# Patient Record
Sex: FEMALE | Race: BLACK OR AFRICAN AMERICAN | Employment: PART TIME | URBAN - METROPOLITAN AREA
[De-identification: names, ages, dates, MRNs, and addresses within clinical notes are randomized per-mention and may not be internally consistent; named-entity substitution may affect disease eponyms.]

---

## 2021-03-17 VITALS
HEART RATE: 76 BPM | SYSTOLIC BLOOD PRESSURE: 170 MMHG | BODY MASS INDEX: 41.62 KG/M2 | DIASTOLIC BLOOD PRESSURE: 74 MMHG | WEIGHT: 212 LBS | HEIGHT: 60 IN

## 2021-03-24 ENCOUNTER — OFFICE VISIT (OUTPATIENT)
Dept: CARDIOLOGY CLINIC | Age: 76
End: 2021-03-24
Payer: MEDICARE

## 2021-03-24 VITALS
DIASTOLIC BLOOD PRESSURE: 78 MMHG | WEIGHT: 192 LBS | HEIGHT: 60 IN | SYSTOLIC BLOOD PRESSURE: 138 MMHG | BODY MASS INDEX: 37.69 KG/M2 | HEART RATE: 86 BPM

## 2021-03-24 DIAGNOSIS — I70.90 ATHEROSCLEROSIS: ICD-10-CM

## 2021-03-24 DIAGNOSIS — E66.01 CLASS 2 SEVERE OBESITY DUE TO EXCESS CALORIES WITH SERIOUS COMORBIDITY IN ADULT, UNSPECIFIED BMI (HCC): ICD-10-CM

## 2021-03-24 DIAGNOSIS — Z00.00 NORMAL CARDIAC EXAM: Primary | ICD-10-CM

## 2021-03-24 DIAGNOSIS — E78.5 BORDERLINE HYPERLIPIDEMIA: ICD-10-CM

## 2021-03-24 PROCEDURE — G8427 DOCREV CUR MEDS BY ELIG CLIN: HCPCS | Performed by: INTERNAL MEDICINE

## 2021-03-24 PROCEDURE — 93000 ELECTROCARDIOGRAM COMPLETE: CPT | Performed by: INTERNAL MEDICINE

## 2021-03-24 PROCEDURE — 99203 OFFICE O/P NEW LOW 30 MIN: CPT | Performed by: INTERNAL MEDICINE

## 2021-03-24 PROCEDURE — G8417 CALC BMI ABV UP PARAM F/U: HCPCS | Performed by: INTERNAL MEDICINE

## 2021-03-24 PROCEDURE — G8484 FLU IMMUNIZE NO ADMIN: HCPCS | Performed by: INTERNAL MEDICINE

## 2021-03-24 PROCEDURE — 1090F PRES/ABSN URINE INCON ASSESS: CPT | Performed by: INTERNAL MEDICINE

## 2021-03-24 RX ORDER — CARVEDILOL 3.12 MG/1
TABLET ORAL
COMMUNITY
Start: 2021-03-09 | End: 2021-09-08

## 2021-03-24 RX ORDER — PRAVASTATIN SODIUM 20 MG
TABLET ORAL
COMMUNITY
Start: 2021-02-08 | End: 2021-09-08

## 2021-03-24 NOTE — ASSESSMENT & PLAN NOTE
Last LDL was 129 not high enough to recommend statin therapy and she reports that this was done on the medication. She will have a follow-up labs with PCP.

## 2021-03-24 NOTE — PATIENT INSTRUCTIONS
Please be informed that if you contact our office outside of normal business hours the physician on call cannot help with any scheduling or rescheduling issues, procedure instruction questions or any type of medication issue. We advise you for any urgent/emergency that you go to the nearest emergency room! PLEASE CALL OUR OFFICE DURING NORMAL BUSINESS HOURS    Monday - Friday   8 am to 5 pm    [place] [address]: [phone]    [place]:  [phone]  **It is YOUR responsibilty to bring medication bottles and/or updated medication list to [address] [place] [address]. This will allow us to better serve you and all your healthcare needs**  Does not want to take any prescription medications. Counseled to continue with regular exercise. Primary prevention is the goal by aggressive risk modification, healthy and therapeutic life style changes for cardiovascular risk reduction. Various goals are discussed and questions answered. Multiple questions answered. Patient verbalizes understanding and asks relevant questions. Follow up with PCP and see me as needed. She request annual follow up.

## 2021-03-24 NOTE — PROGRESS NOTES
medicine. He wants to do everything to prevent problems so she does not have to go on medications. REVIEW OF SYSTEMS:   Constitutional:  denies generalized weakness  Eyes:  Denies change in visual acuity  HENT:  Denies nasal congestion or sore throat  Respiratory:  Denies cough or shortness of breath  Cardiovascular: as in HPI  GI:  Denies abdominal pain, complains of nausea and vomiting  :  Denies dysuria  Musculoskeletal:  Denies back pain or joint pain  Integument:  Denies rash  Neurologic:  Denies headache, focal weakness or sensory changes  \"Remaining review of systems reviewed and negative. Past medical history:  has a past medical history of History of Holter monitoring, History of stress test, Hx of cardiovascular stress test, and Hx of echocardiogram.  Past surgical history:  has a past surgical history that includes Tonsillectomy and Colonoscopy. Social History:   Social History     Tobacco Use    Smoking status: Never Smoker    Smokeless tobacco: Never Used   Substance Use Topics    Alcohol use: No     Family history: family history includes Cancer in her father and mother. No Known Allergies  Prior to Admission medications    Medication Sig Start Date End Date Taking? Authorizing Provider   carvedilol (COREG) 3.125 MG tablet TAKE 1 TABLET BY MOUTH TWICE DAILY WITH FOOD 3/9/21   Historical Provider, MD   pravastatin (PRAVACHOL) 20 MG tablet  2/8/21   Historical Provider, MD     Physical Examination:    Vitals:    03/24/21 1432   BP: 138/78   Pulse: 86   Weight: 192 lb (87.1 kg)   Height: 5' (1.524 m)      Body mass index is 37.5 kg/m². Wt Readings from Last 3 Encounters:   03/24/21 192 lb (87.1 kg)   07/23/18 212 lb (96.2 kg)   08/23/13 215 lb (97.5 kg)     Constitutional: Moderately overweight -American female in no apparent distress  Eyes: She is wearing glasses. No conjunctival pallor noted  ENT: She is wearing a mask. NECK: No JVP or thyromegaly  Cardiovascular:   Auscultation:

## 2021-03-24 NOTE — ASSESSMENT & PLAN NOTE
Patient is asymptomatic. Continue regular exercises and non-pharmacological approach as she is hesitant to take any prescription medications for primary prevention.

## 2021-03-25 ENCOUNTER — TELEPHONE (OUTPATIENT)
Dept: CARDIOLOGY CLINIC | Age: 76
End: 2021-03-25

## 2021-03-25 NOTE — TELEPHONE ENCOUNTER
Pt was a new pt to Dr Alfonso aBss yesterday, she has questions, about he after visit jorge. Please advise.

## 2021-03-25 NOTE — TELEPHONE ENCOUNTER
Patient saw doctor yesterday but has question about the leakage in heart that he mentioned.   Please call pt [phone]

## 2021-03-26 NOTE — TELEPHONE ENCOUNTER
Spoke to the patient and she is questioning the interpretation of her Echo that Dr. Rosendo Knight gave her. She does not see it on her report and was hoping he could explain to her again what he mentioned to her. She was thinking it was along the lines of a leaky valve. Expressed to patient that we would talk with Dr. Rosendo Knight and that we would call her back or have him speak to her again over the phone on Monday. Spoke to MARIZOL Garrido so she can get with Dr. Rosendo Knight on Monday about this patient's request.

## 2021-08-18 ENCOUNTER — TELEPHONE (OUTPATIENT)
Dept: CARDIOLOGY CLINIC | Age: 76
End: 2021-08-18

## 2021-08-18 DIAGNOSIS — E78.5 BORDERLINE HYPERLIPIDEMIA: Primary | ICD-10-CM

## 2021-09-07 ENCOUNTER — TELEPHONE (OUTPATIENT)
Dept: CARDIOLOGY CLINIC | Age: 76
End: 2021-09-07

## 2021-09-07 NOTE — TELEPHONE ENCOUNTER
Chart reviewed. Seen was March 2001. If she is having new symptoms of increasing leg swelling she needs to be seen for further recommendations.

## 2021-09-07 NOTE — TELEPHONE ENCOUNTER
Patient called at her last visit Dr Martir Lopez  Had suggested that she take Coreg  At that time she was reluctant   To take any medications, she indicated   That she has some swelling in her left leg  As well

## 2021-09-08 ENCOUNTER — TELEPHONE (OUTPATIENT)
Dept: CARDIOLOGY CLINIC | Age: 76
End: 2021-09-08

## 2021-09-08 ENCOUNTER — OFFICE VISIT (OUTPATIENT)
Dept: CARDIOLOGY CLINIC | Age: 76
End: 2021-09-08
Payer: MEDICARE

## 2021-09-08 VITALS
WEIGHT: 189.6 LBS | HEART RATE: 70 BPM | DIASTOLIC BLOOD PRESSURE: 80 MMHG | SYSTOLIC BLOOD PRESSURE: 130 MMHG | BODY MASS INDEX: 30.47 KG/M2 | HEIGHT: 66 IN

## 2021-09-08 DIAGNOSIS — E78.5 BORDERLINE HYPERLIPIDEMIA: Primary | ICD-10-CM

## 2021-09-08 DIAGNOSIS — I70.90 ATHEROSCLEROSIS: ICD-10-CM

## 2021-09-08 PROCEDURE — G8400 PT W/DXA NO RESULTS DOC: HCPCS | Performed by: INTERNAL MEDICINE

## 2021-09-08 PROCEDURE — G8427 DOCREV CUR MEDS BY ELIG CLIN: HCPCS | Performed by: INTERNAL MEDICINE

## 2021-09-08 PROCEDURE — 1123F ACP DISCUSS/DSCN MKR DOCD: CPT | Performed by: INTERNAL MEDICINE

## 2021-09-08 PROCEDURE — 99214 OFFICE O/P EST MOD 30 MIN: CPT | Performed by: INTERNAL MEDICINE

## 2021-09-08 PROCEDURE — 1036F TOBACCO NON-USER: CPT | Performed by: INTERNAL MEDICINE

## 2021-09-08 PROCEDURE — 4040F PNEUMOC VAC/ADMIN/RCVD: CPT | Performed by: INTERNAL MEDICINE

## 2021-09-08 PROCEDURE — 3017F COLORECTAL CA SCREEN DOC REV: CPT | Performed by: INTERNAL MEDICINE

## 2021-09-08 PROCEDURE — 1090F PRES/ABSN URINE INCON ASSESS: CPT | Performed by: INTERNAL MEDICINE

## 2021-09-08 PROCEDURE — G8417 CALC BMI ABV UP PARAM F/U: HCPCS | Performed by: INTERNAL MEDICINE

## 2021-09-08 NOTE — LETTER
Cristian Dang  1945  [id number]    Have you had any Chest Pain that is not new? - No          Have you had any Shortness of Breath - No      Have you had any dizziness - No       Have you had any palpitations that are not new? - No    Is the patient on any of the following medications -   If Yes DO EKG - Needs done every 6 months    Do you have any edema - swelling in legs    If Yes - CHECK TO SEE IF THE EDEMA IS PITTING  How long have they been having edema - Months  If Yes - Have they worn compression stockings Yes  If they have worn compression stockings 2 Months    Vein \"LEG PROBLEM Questionnaire\"  1. Do you have prominent leg veins? Yes   2. Do you have any skin discoloration? Yes  3. Do you have any healed or active sores? No  4. Do you have swelling of the legs? Yes  5. Do you have a family history of varicose veins? No  6. 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 Yes  7. Have you been fighting overweight problems? No  8. Do you have restless legs? No  9. Do you have any night time cramps? No  10. Do you have any of the following in your legs:        Aching         If we do not have these labs you are retrieve these labs for these providers! Do you have a surgery or procedure scheduled in the near future -   If Yes- DO EKG  If Yes - Who is the surgery with?    Phone number of physician   Fax number of physician   Type of surgery GIVE THIS INFORMATION TO YRN SAENZ     Ask patient if they want to [facility] if they are not already signed up     Check to see if we have an E-MAIL on file for the patient     Check medication list thoroughly!!! AND RECONCILE OUTSIDE MEDICATIONS  If dose has changed change the entire order not just the [place] At check out add to every patient's \"wrap up\" the following dot phrase AFTERHOURSEDUCATION and ensure we explain this to our patients

## 2021-09-08 NOTE — ASSESSMENT & PLAN NOTE
Patient has no symptoms to suggest significant obstructive coronary artery disease. Counseled for aggressive healthy lifestyle changes for primary prevention.

## 2021-09-08 NOTE — ASSESSMENT & PLAN NOTE
She has not started taking pravastatin and does not like to take any medications. Her lipids are only borderline high on her recent testing on August 23, 2021. LDL was reportedly 129. Counseled for diet and exercise and weight management to reduce it for primary prevention.

## 2021-09-08 NOTE — PATIENT INSTRUCTIONS
**It is YOUR responsibilty to bring medication bottles and/or updated medication list to [address] [place] [address]. This will allow us to better serve you and all your healthcare needs**      Please be informed that if you contact our office outside of normal business hours the physician on call cannot help with any scheduling or rescheduling issues, procedure instruction questions or any type of medication issue. We advise you for any urgent/emergency that you go to the nearest emergency room! PLEASE CALL OUR OFFICE DURING NORMAL BUSINESS HOURS    Monday - Friday   8 am to 5 pm    [place] [address]: [phone]    [place]:  [phone]      Diet exercise and follow up with PCP. Primary prevention is the goal by aggressive risk modification, healthy and therapeutic life style changes for cardiovascular risk reduction. Various goals are discussed and questions answered.

## 2021-09-08 NOTE — PROGRESS NOTES
Renny Gomez (:  1945) is a 76 y.o. female,     Chief Complaint   Patient presents with    Hyperlipidemia     Patient here today to discuss medications and leg swelling.  Edema     Patient states she has been wear compression stocking off and on. Patient states she has prominent leg veins, skin discoloration, swellign in both legs , and she stand for long periods of time.  Other     Patient does not smoke or drink alochol. Patient states she does walk about 30min to 1 hour a day. Patient is here to discuss health concerns particularly she wants to talk about whether she has fungus infection in her body and if we can do test to rule it out and she wanted to just be here to talk to me because she has no one to talk to. In fact she had called yesterday asking to see if she can start carvedilol therapy for increasing leg swelling reason for us to arrange this office visit. Last time she was seen was in March of this year and she did not have any leg swelling at the time. Today she tells me actually she does not have any leg swelling now she is wanted to be here to talk to us about issues mentioned above. She thinks she needs to be checked for fungal infection in her body and wanted to know if her heart is okay. She has been listed with carvedilol and pravastatin as medications she should be taking prescribed by some other physicians however she tells me she is not taking any prescription medications and she rather not take 1. She does not smoke. She stays active and denies any chest pain or shortness of breath or palpitations or syncope or near syncope. She is fully vaccinated against COVID-19. No Known Allergies  Prior to Admission medications    Not on File     Past Medical History:   Diagnosis Date    History of Holter monitoring 2018    24 hour monitor: SR noted, Min HR 52 bpm, Ave HR 79 bpm, Max  bpm, no significant pauses noted.  no diary entries    History of stress test 07/26/2018    Stress Nuclear: EF 70%, ESV 8 Mild to Mod lateral & inferior wall perfusion defect(s), Artifacts decrease sensitivity of the finding. non diagnostic ST segment changes no arrythmia    Hx of cardiovascular stress test 07/30/2015    treadmill    Hx of echocardiogram 07/26/2018    EF 57%, LA normal, Mild-moderate MR, Impaired relaxation diastolic dysfunction, LA diam 3.7 cm, Est RVSP 39 mmHg, Normal LVEDP      Vitals:    09/08/21 1514   BP: 130/80   Pulse: 70   Weight: 189 lb 9.6 oz (86 kg)   Height: 5' 6\" (1.676 m)      Body mass index is 30.6 kg/m². Wt Readings from Last 3 Encounters:   09/08/21 189 lb 9.6 oz (86 kg)   03/24/21 192 lb (87.1 kg)   07/23/18 212 lb (96.2 kg)     Constitutional:  Patient is mildly overweight female in no apparent distress. HEENT: She is wearing facemask and glasses otherwise unremarkable. Cardiovascular: Auscultation: Normal S1 and S2. No murmurs or gallops noted. Carotids negative for bruits. Abdominal aorta is nonpalpable. No epigastric bruit noted. Peripheral pulses: Pedal pulses are 1-2+ equal in both feet. Respiratory:  Respiratory effort is normal. Breath sounds are clear to auscultation. Extremities:  No edema, clubbing,  Cyanosis, petechiae. Abdomen:  No masses or tenderness. No organomegaly noted. Neurologic:  Oriented to time, place, and person and non-anxious. No focal neurological deficit noted. Psychiatric: Normal mood and effect.      Pertinent records reviewed and discussed with patient and results are as follow:    Lab Results   Component Value Date    WBC 6.9 07/23/2015    HGB 12.6 07/23/2015    HCT 37.1 07/23/2015     07/23/2015     Lab Results   Component Value Date    CHOL 191 08/23/2021    TRIG 107 08/23/2021    HDL 41 08/23/2021    LDLCALC 129 (H) 08/23/2021    LDLDIRECT 120 (H) 05/29/2015     Lab Results   Component Value Date    BUN 9 07/23/2015    CREATININE 0.8 07/23/2015     07/23/2015    K 3.8 07/23/2015     Lab Results   Component Value Date    INR 1.10 07/23/2015     ASSESSMENT/PLAN:    1. Borderline hyperlipidemia  Assessment & Plan:  She has not started taking pravastatin and does not like to take any medications. Her lipids are only borderline high on her recent testing on August 23, 2021. LDL was reportedly 129. Counseled for diet and exercise and weight management to reduce it for primary prevention. 2. Atherosclerosis  Assessment & Plan:  Patient has no symptoms to suggest significant obstructive coronary artery disease. Counseled for aggressive healthy lifestyle changes for primary prevention. Diet exercise and follow up with PCP. Primary prevention is the goal by aggressive risk modification, healthy and therapeutic life style changes for cardiovascular risk reduction. Various goals are discussed and questions answered. An electronic signature was used to authenticate this note.     --Beata Lara MD

## 2021-09-09 ENCOUNTER — TELEPHONE (OUTPATIENT)
Dept: CARDIOLOGY CLINIC | Age: 76
End: 2021-09-09

## 2021-09-09 NOTE — TELEPHONE ENCOUNTER
Patient called stating that she told Alfonso Negrete that her legs burn/sting sometimes and that there is a stiffness in the the back of her neck that may be arthritis. Please call her back to let her know that Dr. Martha Garcia knows.

## 2021-09-10 NOTE — TELEPHONE ENCOUNTER
Patient feels that she is not being listened to, wants to look into fungal infection. Advised to see PCP and discuss these issues. She wondered why we didn't do ekg, advised it was done in March at her initial visit, she did not recall.  Patient thanked me for listening and will follow up with PCP

## 2022-03-23 ENCOUNTER — OFFICE VISIT (OUTPATIENT)
Dept: CARDIOLOGY CLINIC | Age: 77
End: 2022-03-23
Payer: COMMERCIAL

## 2022-03-23 VITALS
DIASTOLIC BLOOD PRESSURE: 76 MMHG | HEIGHT: 66 IN | WEIGHT: 188 LBS | BODY MASS INDEX: 30.22 KG/M2 | SYSTOLIC BLOOD PRESSURE: 122 MMHG | HEART RATE: 80 BPM

## 2022-03-23 DIAGNOSIS — E78.5 BORDERLINE HYPERLIPIDEMIA: Primary | ICD-10-CM

## 2022-03-23 DIAGNOSIS — M79.89 LEFT LEG SWELLING: ICD-10-CM

## 2022-03-23 PROCEDURE — G8417 CALC BMI ABV UP PARAM F/U: HCPCS | Performed by: INTERNAL MEDICINE

## 2022-03-23 PROCEDURE — 4040F PNEUMOC VAC/ADMIN/RCVD: CPT | Performed by: INTERNAL MEDICINE

## 2022-03-23 PROCEDURE — 1036F TOBACCO NON-USER: CPT | Performed by: INTERNAL MEDICINE

## 2022-03-23 PROCEDURE — G8484 FLU IMMUNIZE NO ADMIN: HCPCS | Performed by: INTERNAL MEDICINE

## 2022-03-23 PROCEDURE — G8400 PT W/DXA NO RESULTS DOC: HCPCS | Performed by: INTERNAL MEDICINE

## 2022-03-23 PROCEDURE — 1123F ACP DISCUSS/DSCN MKR DOCD: CPT | Performed by: INTERNAL MEDICINE

## 2022-03-23 PROCEDURE — 1090F PRES/ABSN URINE INCON ASSESS: CPT | Performed by: INTERNAL MEDICINE

## 2022-03-23 PROCEDURE — G8427 DOCREV CUR MEDS BY ELIG CLIN: HCPCS | Performed by: INTERNAL MEDICINE

## 2022-03-23 PROCEDURE — 99213 OFFICE O/P EST LOW 20 MIN: CPT | Performed by: INTERNAL MEDICINE

## 2022-03-23 NOTE — PROGRESS NOTES
Otf Dominguez (:  1945) is a 68 y.o. female,     Chief Complaint   Patient presents with    1 Year Follow Up     Pt denies any cardiac symptoms. Some swelling on the left leg and No surgeries or procedures scheduled. Patient is here for leg swelling. She reports her left leg swelling and it hurts when she is not walking. She walks 2 miles a day for exercise regularly without any pain. She does not take much medications. She does not smoke. She was seen last year and she was asked to follow-up with PCP and see me as needed. No Known Allergies  Prior to Admission medications    Not on File     Past Medical History:   Diagnosis Date    History of Holter monitoring 2018    24 hour monitor: SR noted, Min HR 52 bpm, Ave HR 79 bpm, Max  bpm, no significant pauses noted. no diary entries    History of stress test 2018    Stress Nuclear: EF 70%, ESV 8 Mild to Mod lateral & inferior wall perfusion defect(s), Artifacts decrease sensitivity of the finding. non diagnostic ST segment changes no arrythmia    Hx of cardiovascular stress test 2015    treadmill    Hx of echocardiogram 2018    EF 57%, LA normal, Mild-moderate MR, Impaired relaxation diastolic dysfunction, LA diam 3.7 cm, Est RVSP 39 mmHg, Normal LVEDP    Left leg swelling 3/23/2022    1+ pitting edema in leg but not in ankle      Vitals:    22 1056   BP: 122/76   Pulse: 80   Weight: 188 lb (85.3 kg)   Height: 5' 6\" (1.676 m)      Body mass index is 30.34 kg/m². Wt Readings from Last 3 Encounters:   22 188 lb (85.3 kg)   21 189 lb 9.6 oz (86 kg)   21 192 lb (87.1 kg)     Constitutional:  Patient is mildly overweight female in no apparent distress. HEENT: She is wearing facemask and glasses otherwise unremarkable. Cardiovascular: Auscultation: Normal S1 and S2. No murmurs or gallops noted. Carotids negative for bruits. Abdominal aorta is nonpalpable. No epigastric bruit noted. Peripheral pulses: Pedal pulses are 1-2+ equal in both feet. Respiratory:  Respiratory effort is normal. Breath sounds are clear to auscultation. Extremities:  1+ left leg edema noted. Ankle has no pitting. Abdomen:  No masses or tenderness. No organomegaly noted. Neurologic:  Oriented to time, place, and person and non-anxious. No focal neurological deficit noted. Psychiatric: Normal mood and effect. Pertinent records reviewed and discussed with patient and results are as follow:    Lab Results   Component Value Date    WBC 6.9 07/23/2015    HGB 12.6 07/23/2015    HCT 37.1 07/23/2015     07/23/2015     Lab Results   Component Value Date    CHOL 191 08/23/2021    TRIG 107 08/23/2021    HDL 41 08/23/2021    LDLCALC 129 (H) 08/23/2021    LDLDIRECT 120 (H) 05/29/2015     Lab Results   Component Value Date    BUN 9 07/23/2015    CREATININE 0.8 07/23/2015     07/23/2015    K 3.8 07/23/2015     Lab Results   Component Value Date    INR 1.10 07/23/2015     ASSESSMENT/PLAN:    1. Left leg swelling  Assessment & Plan:   Counseled to elevate feet when resting and when resting in bed at night with pillows underneath and use thigh high compression stockings in the morning and remove them at night. Patient verbalizes understanding. Questions answered. Counseled for salt restriction. Continue to exercise regularly  Orders:  -     VL DUP LOWER EXTREMITY VENOUS LEFT; Future  -     Comprehensive Metabolic Panel; Future      Counseled to elevate feet when resting and when resting in bed at night with pillows underneath and use thigh high compression stockings in the morning and remove them at night. Patient verbalizes understanding. Questions answered. Counseled for salt restriction. Continue to exercise regularly. Follow-up in 6 months, sooner if needed. An electronic signature was used to authenticate this note.     --Emeka Lawton MD

## 2022-03-23 NOTE — PATIENT INSTRUCTIONS
Counseled to elevate feet when resting and when resting in bed at night with pillows underneath and use thigh high compression stockings in the morning and remove them at night. Patient verbalizes understanding. Questions answered. Counseled for salt restriction. Continue to exercise regularly. Follow-up in 6 months, sooner if needed.

## 2022-03-23 NOTE — ASSESSMENT & PLAN NOTE
Counseled to elevate feet when resting and when resting in bed at night with pillows underneath and use thigh high compression stockings in the morning and remove them at night. Patient verbalizes understanding. Questions answered. Counseled for salt restriction.   Continue to exercise regularly

## 2022-03-24 ENCOUNTER — TELEPHONE (OUTPATIENT)
Dept: CARDIOLOGY CLINIC | Age: 77
End: 2022-03-24

## 2022-03-24 NOTE — TELEPHONE ENCOUNTER
Does not have any significant known cardiac issues. She should have PCP evaluate her for these symptoms.

## 2022-03-24 NOTE — TELEPHONE ENCOUNTER
Pt came into office today stating that she did not tell anyone yesterday that her neck is stiff and she wants to have her LT arm checked out because she sometimes has discomfort in it.

## 2022-03-31 ENCOUNTER — PROCEDURE VISIT (OUTPATIENT)
Dept: CARDIOLOGY CLINIC | Age: 77
End: 2022-03-31
Payer: COMMERCIAL

## 2022-03-31 DIAGNOSIS — M79.89 LEFT LEG SWELLING: ICD-10-CM

## 2022-03-31 PROCEDURE — 93971 EXTREMITY STUDY: CPT | Performed by: INTERNAL MEDICINE

## 2022-04-02 ENCOUNTER — TELEPHONE (OUTPATIENT)
Dept: CARDIOLOGY CLINIC | Age: 77
End: 2022-04-02

## 2022-04-02 NOTE — TELEPHONE ENCOUNTER
No evidence of DVT or SVT in the left common femoral vein, femoral vein,    popliteal vein, greater saphenous vein or mid and distal small saphenous    vein.    Possible proximal chronic, non-occlusive SVT.    Significant reflux noted in the Left CFV (1.3s), Prox SSV (1.0s), Mid SSV    (0.5s), and Distal SSV (0.6s).  Tributary noted in the left GSV at the level of the knee, mid calf, and    distal calf.        Recommendations        Advice thigh high pressure stockings, 20 to 30 mm of Hg.    Keep feet elevated.    Increase walking.    Significant changes or pathology noted. Schedule for OV for follow up. Pt answered, scheduled to come in regarding US results. Also relayed recommendations.   [facility]

## 2022-04-06 ENCOUNTER — OFFICE VISIT (OUTPATIENT)
Dept: CARDIOLOGY CLINIC | Age: 77
End: 2022-04-06
Payer: COMMERCIAL

## 2022-04-06 VITALS
DIASTOLIC BLOOD PRESSURE: 76 MMHG | HEART RATE: 80 BPM | WEIGHT: 184.8 LBS | SYSTOLIC BLOOD PRESSURE: 130 MMHG | HEIGHT: 66 IN | BODY MASS INDEX: 29.7 KG/M2

## 2022-04-06 DIAGNOSIS — M79.89 LEFT LEG SWELLING: Primary | ICD-10-CM

## 2022-04-06 DIAGNOSIS — I87.2 VENOUS INSUFFICIENCY: ICD-10-CM

## 2022-04-06 PROCEDURE — 99213 OFFICE O/P EST LOW 20 MIN: CPT | Performed by: INTERNAL MEDICINE

## 2022-04-06 PROCEDURE — 1123F ACP DISCUSS/DSCN MKR DOCD: CPT | Performed by: INTERNAL MEDICINE

## 2022-04-06 PROCEDURE — G8417 CALC BMI ABV UP PARAM F/U: HCPCS | Performed by: INTERNAL MEDICINE

## 2022-04-06 PROCEDURE — G8427 DOCREV CUR MEDS BY ELIG CLIN: HCPCS | Performed by: INTERNAL MEDICINE

## 2022-04-06 PROCEDURE — 1036F TOBACCO NON-USER: CPT | Performed by: INTERNAL MEDICINE

## 2022-04-06 PROCEDURE — 1090F PRES/ABSN URINE INCON ASSESS: CPT | Performed by: INTERNAL MEDICINE

## 2022-04-06 PROCEDURE — 4040F PNEUMOC VAC/ADMIN/RCVD: CPT | Performed by: INTERNAL MEDICINE

## 2022-04-06 PROCEDURE — G8400 PT W/DXA NO RESULTS DOC: HCPCS | Performed by: INTERNAL MEDICINE

## 2022-04-06 NOTE — PROGRESS NOTES
Shelli Caraballo (:  1945) is a 68 y.o. female,     Chief Complaint   Patient presents with    Results     f/u from vascular study pt denies any new cardiac sx. pt states previous sx are about the same. pt does not smoke or drink. pt has no future procedures. pt walks for exercise weather permmitting. Patient is here for follow up for venous Doppler study done recently for left lower extremity swelling. She has gotten compression stockings and started wearing them 3 days ago and it is helping her symptoms and swelling. Denies any other new symptoms today. No Known Allergies  Prior to Admission medications    Medication Sig Start Date End Date Taking? Authorizing Provider   Compression Stockings MISC by Does not apply route 20-30 mmg thigh high stockings wear daily when up and remove at night at bedtime. 22  Yes Kamran Wyman MD     Past Medical History:   Diagnosis Date    History of Holter monitoring 2018    24 hour monitor: SR noted, Min HR 52 bpm, Ave HR 79 bpm, Max  bpm, no significant pauses noted. no diary entries    History of stress test 2018    Stress Nuclear: EF 70%, ESV 8 Mild to Mod lateral & inferior wall perfusion defect(s), Artifacts decrease sensitivity of the finding. non diagnostic ST segment changes no arrythmia    Hx of cardiovascular stress test 2015    treadmill    Hx of echocardiogram 2018    EF 57%, LA normal, Mild-moderate MR, Impaired relaxation diastolic dysfunction, LA diam 3.7 cm, Est RVSP 39 mmHg, Normal LVEDP    Left leg swelling 3/23/2022    1+ pitting edema in leg but not in ankle    Venous insufficiency 2022    Left lower extremity by venous doppler 3/2022      Vitals:    22 1505   BP: 130/76   Pulse: 80   Weight: 184 lb 12.8 oz (83.8 kg)   Height: 5' 6\" (1.676 m)      Body mass index is 29.83 kg/m².   Wt Readings from Last 3 Encounters:   22 184 lb 12.8 oz (83.8 kg)   22 188 lb (85.3 kg)   21 189 lb 9.6 oz (86 kg)       Lower extremity venous Doppler study on 3/31/2022 reported  No evidence of DVT or SVT in the left common femoral vein, femoral vein,    popliteal vein, greater saphenous vein or mid and distal small saphenous    vein.    Possible proximal chronic, non-occlusive SVT.    Significant reflux noted in the Left CFV (1.3s), Prox SSV (1.0s), Mid SSV    (0.5s), and Distal SSV (0.6s).  Tributary noted in the left GSV at the level of the knee, mid calf, and    distal calf. We discussed the results of this and multiple questions addressed. Pertinent records reviewed and discussed with patient and results are as follow:    Lab Results   Component Value Date    WBC 6.9 07/23/2015    HGB 12.6 07/23/2015    HCT 37.1 07/23/2015     07/23/2015     Lab Results   Component Value Date    CHOL 191 08/23/2021    TRIG 107 08/23/2021    HDL 41 08/23/2021    LDLCALC 129 (H) 08/23/2021    LDLDIRECT 120 (H) 05/29/2015     Lab Results   Component Value Date    BUN 9 07/23/2015    CREATININE 0.8 07/23/2015     07/23/2015    K 3.8 07/23/2015     Lab Results   Component Value Date    INR 1.10 07/23/2015     ASSESSMENT/PLAN:    1. Left leg swelling  Assessment & Plan:  Counseled to elevate feet when resting and when resting in bed at night with pillows underneath and use thigh high compression stockings in the morning and remove them at night. Patient verbalizes understanding. Questions answered. 2. Venous insufficiency  Assessment & Plan:  If conservative therapy does not work we will refer her to Dr. Artem Garcia for venous ablation. Counseled to elevate feet when resting and when resting in bed at night with pillows underneath and use thigh high compression stockings in the morning and remove them at night. Patient verbalizes understanding. Questions answered. Follow-up in 6 months, sooner if needed.     On this date 4/6/2022 I have spent 15 minutes reviewing previous notes, test results and face to face with the patient discussing the diagnosis and importance of compliance with the treatment plan as well as documenting on the day of the visit. An electronic signature was used to authenticate this note.     --Bina Cook MD

## 2022-04-06 NOTE — PATIENT INSTRUCTIONS
Counseled to elevate feet when resting and when resting in bed at night with pillows underneath and use thigh high compression stockings in the morning and remove them at night. Patient verbalizes understanding. Questions answered.   Follow-up in 6 months, sooner if needed

## 2022-04-11 ENCOUNTER — TELEPHONE (OUTPATIENT)
Dept: CARDIOLOGY CLINIC | Age: 77
End: 2022-04-11

## 2022-04-11 NOTE — TELEPHONE ENCOUNTER
Patient wants to know if her script for compression hose if for only one pair, she would like to get two pair so she can wash one.   Please call patient

## 2022-10-12 ENCOUNTER — TELEPHONE (OUTPATIENT)
Dept: CARDIOLOGY CLINIC | Age: 77
End: 2022-10-12

## 2022-10-12 NOTE — TELEPHONE ENCOUNTER
Attemtped to leave voice message to reschedule 6 Month Follow Up with Dr Antolin Manzano, as patient did not show up today. Patient did not answer call.

## 2023-06-05 ENCOUNTER — APPOINTMENT (OUTPATIENT)
Dept: GENERAL RADIOLOGY | Age: 78
End: 2023-06-05
Payer: MEDICARE

## 2023-06-05 ENCOUNTER — HOSPITAL ENCOUNTER (EMERGENCY)
Age: 78
Discharge: HOME | End: 2023-06-05
Attending: EMERGENCY MEDICINE
Payer: MEDICARE

## 2023-06-05 VITALS
DIASTOLIC BLOOD PRESSURE: 63 MMHG | SYSTOLIC BLOOD PRESSURE: 151 MMHG | TEMPERATURE: 97.5 F | WEIGHT: 176 LBS | HEIGHT: 66 IN | RESPIRATION RATE: 18 BRPM | HEART RATE: 65 BPM | OXYGEN SATURATION: 98 % | BODY MASS INDEX: 28.28 KG/M2

## 2023-06-05 DIAGNOSIS — S62.111A CLOSED CHIP FRACTURE OF TRIQUETRUM OF RIGHT WRIST, INITIAL ENCOUNTER: ICD-10-CM

## 2023-06-05 DIAGNOSIS — S42.401A CLOSED FRACTURE OF RIGHT ELBOW, INITIAL ENCOUNTER: Primary | ICD-10-CM

## 2023-06-05 PROCEDURE — 73110 X-RAY EXAM OF WRIST: CPT

## 2023-06-05 PROCEDURE — 29105 APPLICATION LONG ARM SPLINT: CPT

## 2023-06-05 PROCEDURE — 6370000000 HC RX 637 (ALT 250 FOR IP): Performed by: EMERGENCY MEDICINE

## 2023-06-05 PROCEDURE — 73080 X-RAY EXAM OF ELBOW: CPT

## 2023-06-05 PROCEDURE — 99283 EMERGENCY DEPT VISIT LOW MDM: CPT

## 2023-06-05 RX ORDER — ACETAMINOPHEN 500 MG
1000 TABLET ORAL
Status: DISCONTINUED | OUTPATIENT
Start: 2023-06-05 | End: 2023-06-05

## 2023-06-05 RX ORDER — HYDROCODONE BITARTRATE AND ACETAMINOPHEN 5; 325 MG/1; MG/1
1 TABLET ORAL
Status: COMPLETED | OUTPATIENT
Start: 2023-06-05 | End: 2023-06-05

## 2023-06-05 RX ORDER — OXYCODONE HYDROCHLORIDE AND ACETAMINOPHEN 5; 325 MG/1; MG/1
1 TABLET ORAL EVERY 6 HOURS PRN
Qty: 12 TABLET | Refills: 0 | Status: SHIPPED | OUTPATIENT
Start: 2023-06-05 | End: 2023-06-08

## 2023-06-05 RX ORDER — IBUPROFEN 600 MG/1
600 TABLET ORAL EVERY 6 HOURS PRN
Qty: 20 TABLET | Refills: 0 | Status: SHIPPED | OUTPATIENT
Start: 2023-06-05 | End: 2023-07-05

## 2023-06-05 RX ORDER — DOCUSATE SODIUM 100 MG/1
100 CAPSULE, LIQUID FILLED ORAL 2 TIMES DAILY
Qty: 20 CAPSULE | Refills: 0 | Status: SHIPPED | OUTPATIENT
Start: 2023-06-05 | End: 2023-06-15

## 2023-06-05 RX ADMIN — HYDROCODONE BITARTRATE AND ACETAMINOPHEN 1 TABLET: 5; 325 TABLET ORAL at 07:39

## 2023-06-05 ASSESSMENT — PAIN SCALES - GENERAL: PAINLEVEL_OUTOF10: 8

## 2023-06-05 ASSESSMENT — LIFESTYLE VARIABLES
HOW MANY STANDARD DRINKS CONTAINING ALCOHOL DO YOU HAVE ON A TYPICAL DAY: PATIENT DOES NOT DRINK
HOW OFTEN DO YOU HAVE A DRINK CONTAINING ALCOHOL: NEVER

## 2023-06-05 NOTE — ED PROVIDER NOTES
wrist: Age-indeterminate less than 1 mm triquetral avulsion fragments. Procedures:  Procedure Note - Splint: The benefits, risks, and alternatives of long arm splint were discussed with the patient. Questions were sought and answered. Verbal consent was given for the procedure. Immobilization was performed and the extremity's neurovascular status was re-checked and was unchanged from the pre-procedure exam. The patient tolerated the procedure without complications. Instructions were given to return immediately for increasing pain, new numbness or weakness, a cold/pale extremity or any other worsening or worrisome concerns. Chart review shows recent radiographs:  No results found. MDM:     Discussion with Other Professionals : None    Social Determinants: None    Records Reviewed : Outpatient Notes from general surgeon's office visit on 3/13/2023 shows the patient is being treated for varicosities on her right leg    Chronic conditions affecting care: Arthritis, varicosities    Imaging interpreted and reviewed by myself: Right elbow and right wrist XR showed small irregularity of the elbow possible avulsion fracture and triquetrium avulsion fracture    Patient was given the following medications:  Medications   HYDROcodone-acetaminophen (NORCO) 5-325 MG per tablet 1 tablet (1 tablet Oral Given 6/5/23 0791)       Disposition Discussion: We will treat as though she has elbow and wrist fractures. Patient placed in a splint and sling and then given instructions to follow-up closely with orthopedist and primary care doctor as well as return here with any change or worsening. Discharged home with analgesia and return precautions. She was agreeable with plan of care. Is this patient to be included in the SEP-1 core measure due to severe sepsis or septic shock? No Exclusion criteria - the patient is NOT to be included for SEP-1 Core Measure due to:  Infection is not suspected     Disposition:

## 2023-06-05 NOTE — ED NOTES
Care and report received from [facility], [facility], [facility]  06/05/23 2044

## 2023-06-05 NOTE — ED NOTES
Dr. Reji Dunne checked and confirmed the placement of the sling.      Devendra Franco RN  06/05/23 6594

## 2023-06-15 PROBLEM — S52.124A CLOSED NONDISPLACED FRACTURE OF HEAD OF RIGHT RADIUS: Status: ACTIVE | Noted: 2023-06-15

## 2023-06-15 PROBLEM — S62.111A TRIQUETRAL CHIP FRACTURE, RIGHT, CLOSED, INITIAL ENCOUNTER: Status: ACTIVE | Noted: 2023-06-15

## 2023-07-11 ENCOUNTER — OFFICE VISIT (OUTPATIENT)
Dept: ORTHOPEDIC SURGERY | Age: 78
End: 2023-07-11

## 2023-07-11 VITALS
OXYGEN SATURATION: 96 % | WEIGHT: 182 LBS | HEIGHT: 66 IN | HEART RATE: 79 BPM | RESPIRATION RATE: 15 BRPM | BODY MASS INDEX: 29.25 KG/M2

## 2023-07-11 DIAGNOSIS — S52.124A CLOSED NONDISPLACED FRACTURE OF HEAD OF RIGHT RADIUS, INITIAL ENCOUNTER: Primary | ICD-10-CM

## 2023-07-11 DIAGNOSIS — S62.111A TRIQUETRAL CHIP FRACTURE, RIGHT, CLOSED, INITIAL ENCOUNTER: ICD-10-CM

## 2023-07-11 PROCEDURE — 99024 POSTOP FOLLOW-UP VISIT: CPT | Performed by: ORTHOPAEDIC SURGERY

## 2023-07-11 RX ORDER — IBUPROFEN 600 MG/1
600 TABLET ORAL EVERY 8 HOURS PRN
Qty: 60 TABLET | Refills: 1 | Status: SHIPPED | OUTPATIENT
Start: 2023-07-11

## 2023-07-18 ENCOUNTER — HOSPITAL ENCOUNTER (OUTPATIENT)
Dept: OCCUPATIONAL THERAPY | Age: 78
Setting detail: THERAPIES SERIES
Discharge: HOME | End: 2023-07-18
Payer: MEDICARE

## 2023-07-18 PROCEDURE — 97140 MANUAL THERAPY 1/> REGIONS: CPT

## 2023-07-18 PROCEDURE — 97166 OT EVAL MOD COMPLEX 45 MIN: CPT

## 2023-07-18 PROCEDURE — 97110 THERAPEUTIC EXERCISES: CPT

## 2023-07-18 PROCEDURE — 97530 THERAPEUTIC ACTIVITIES: CPT

## 2023-07-18 NOTE — FLOWSHEET NOTE
Occupational Therapy Out Patient Daily Treatment Note     [x][address]     [address]  [address]       [address] [facility], [address] [facility], [address], [address]     [phone]  [phone] [phone]  ______________________________________________________________________  Date:  2023  Patient Name:  Grace Godinez    :  1945  Restrictions/Precautions:  General  Diagnosis:   R triquetral and radial head fractures  Treatment Diagnosis:   Hand and wrist pain and stiffness  Insurance/Certification information:  [facility]  Referring Physician:   Dr Anastasia Velasquez of care signed (Y/N):    Visit# / total visits: 1 /10  COVID screening questions were asked and patient attested that there had been no contact or symptoms    Pain level: 5/10 Not constant    Subjective:   Prior Level of Function:  Full functional use before injury  Patient Goals: Return to PLOF    Treatment Flowsheet   Right Left     See eval x                                                                                                                                    Interventions/Modalities used:  [x] Therapeutic Exercise   [x] Modalities:  [x] Therapeutic Activity    [] Ultrasound [] Elec Stimulation   [] Total Motion Release    [] Fluido [] Kinesiotaping  [] Neuromuscular Re-education   [] Ionto [x] Coldpack/hotpack   [x] Instruction in HEP    Other:    Objective Findings: See eval    Communication with other providers: POC to physician    Education provided to patient:    Adverse Reactions to treatment: none noted    Time in: 845  Time out:  945  Timed treatment minutes:  40  Total treatment time:  60    5 min HP  If BWC Please Indicate Time In/Out  CPT Code Time In Time Out Total Min                                                                    Treatment/Activity Tolerance:     [x]  Patient tolerated

## 2023-07-18 NOTE — PLAN OF CARE
[x][address], [address] [place] [address]     [phone] [phone] ATR:[phone]  ________________________________________________________________________    Physician:    Dr Prabha Felder From: [facility]  Patient: Nolan Roach      : 1945  Diagnosis:   R triquetral and radial head fx  Physician ICD 10 Code: C29.810I  S52.124A   Treatment Diagnosis:  R wrist and hand stiffness  ICD10 tx code: M25.532  Date: 2023     MRN: [id number]     Occupational Therapy Certification/Re-Certification Form  Dear Garcia Coronado  The following patient has been evaluated for occupational therapy services and for therapy to continue, insurance requires physician review of the treatment plan initially and every 90 days. Please review the attached evaluation and/or summary of the patient's plan of care, and verify that you agree therapy should continue by signing the attached document and sending it back to our office.     Plan of Care/Treatment to date:  [x] Therapeutic Exercise   [x] Modalities:  [x] Therapeutic Activity    [] Ultrasound [] Elec Stimulation   [] Total Motion Release    [] Fluido [] Kinesiotaping  [] Neuromuscular Re-education   [] Ionto [x] Coldpack/hotpack   [x] Instruction in HEP    Other:  [x] Manual Therapy     []   [] Aquatic Therapy     []       Frequency/Duration:  # Days per week: [] 1 day # Weeks: [] 1 week [x] 5 weeks     [x] 2 days   [] 2 weeks [] 6 weeks     [] 3 days   [] 3 weeks [] 7 weeks     [] 4 days   [] 4 weeks [] 8 weeks         [] 9 weeks [] 10 weeks         [] 11 weeks [] 12 weeks    Rehab Potential/Progress: [] excellent [x] good [] fair  [] poor       Goals:  OutComes Score:   QUICK DASH:84.1  Goals:   Pt will decrease pain with use to 1-2/10 to increase functional activity tolerance   Pt

## 2023-07-18 NOTE — PROGRESS NOTES
Right  Left    Hand Volumeter     Circumference: Palm      Wrist Crease     Base of Index     Base of Long     Base of Ring     Base of Small      MINIMAL    Other:   Barriers to Learning:            No barriers noted       Assessment:    Assessment  Performance deficits / Impairments: Decreased ROM; Decreased strength  Assessment: Pt presents with pain intermittently of R wrist, minimal edema, Decreased AROM of wrist and hand, Decreased  strength. Numbness in tips of fingers. Pt reports inability to open jars, cook and stir, wash dishes  Treatment Diagnosis: M25.532  Prognosis: Good  Decision Making: Medium Complexity  History: PMH: HTN  Meds: only supplements  NKA  Exam: ROM, strength, edema, pain, sensation  Assistance / Modification: None needed  Treatment Initiated : Heat, A/PROM, nerve and tendon gliding, gentle strengthening, issued and instructed in HEP       Plan:    Occupational Therapy Plan  Plan Weeks: 2 x week x 5 week  Current Treatment Recommendations: Strengthening, ROM, Modalities, Patient/Caregiver education & training    OutComes Score:   QUICK DASH:84.1  Goals:   Pt will decrease pain with use to 1-2/10 to increase functional activity tolerance   Pt will increase AROM of R wrist and hand to [facility] to increase functional mobility for daily activities   Pt will increase AROM R elbow to WNL to increase functional use of R elbow   Pt will follow thru and be independent with HEP    LTGs  Pt will decrease pain with use to 0-1/10 to increase functional activity tolerance   Pt will increase AROM R wrist and hand to WNL to return to [facility] for self care   Pt will increase R  20-30# to increase functional grasp to perform daily chores of cooking, washing dishes, opening jars.   Pt will decrease quick dash below 50 for significant performance improvement    Therapy Time:   Individual Concurrent Group Co-treatment   Time In 0845         Time Out 0945         Minutes [address] [facility] [address], OTRL CHT

## 2023-07-24 ENCOUNTER — HOSPITAL ENCOUNTER (OUTPATIENT)
Dept: OCCUPATIONAL THERAPY | Age: 78
Setting detail: THERAPIES SERIES
Discharge: HOME | End: 2023-07-24
Payer: MEDICARE

## 2023-07-24 PROCEDURE — 97530 THERAPEUTIC ACTIVITIES: CPT

## 2023-07-24 PROCEDURE — 97022 WHIRLPOOL THERAPY: CPT

## 2023-07-24 PROCEDURE — 97110 THERAPEUTIC EXERCISES: CPT

## 2023-07-24 NOTE — FLOWSHEET NOTE
Occupational Therapy Out Patient Daily Treatment Note     [x][address]     [address]       [address] [facility], [address], [address]       Asya Fly, [address]     [phone]  [phone] [phone]  ______________________________________________________________________  Date:  2023  Patient Name:  Antoinette Palomo    :  1945  Restrictions/Precautions:  General  Diagnosis:   R triquetral and radial head fractures  Treatment Diagnosis:   Hand and wrist pain and stiffness  Insurance/Certification information:  [facility]  Referring Physician:   Dr Sydnie Machado of care signed (Y/N):    Visit# / total visits: 2/10  COVID screening questions were asked and patient attested that there had been no contact or symptoms    Pain level: 4-5/10 Not constant    Subjective: States has been using hand more. Prior Level of Function:  Full functional use before injury  Patient Goals: Return to PLOF    Treatment Flowsheet   Right Left   Fluidotherapy x    AROM PROM wrist and hand x      Digiflex 3# x    Pinch pin 2# lateral and palmar x    Wrist flex and ext 1# x    Rubber band with finger extension x    8oz hammer sup/pron x                                                                                          Interventions/Modalities used:  [x] Therapeutic Exercise   [x] Modalities:  [x] Therapeutic Activity    [] Ultrasound [] Elec Stimulation   [] Total Motion Release    [] Fluido [] Kinesiotaping  [] Neuromuscular Re-education   [] Ionto [x] Coldpack/hotpack   [x] Instruction in HEP    Other:    Objective Findings: Able to make a full fist with all but index after heat and exercise. Wrist extension 42 flexion 45.  Index MP 70 and PIP 90    Communication with other providers: POC to physician    Education provided to patient:    Adverse Reactions to treatment: none noted    Time in:

## 2023-07-26 ENCOUNTER — HOSPITAL ENCOUNTER (OUTPATIENT)
Dept: OCCUPATIONAL THERAPY | Age: 78
Setting detail: THERAPIES SERIES
Discharge: HOME | End: 2023-07-26
Payer: MEDICARE

## 2023-07-26 NOTE — FLOWSHEET NOTE
noted    Time in: 1345  Time out:  1430  Timed treatment minutes:  25  Total treatment time: 45   5 min HP  If BWC Please Indicate Time In/Out  CPT Code Time In Time Out Total Min                                                                    Treatment/Activity Tolerance:     [x]  Patient tolerated treatment well []  Patient limited by fatique    []  Patient limited by pain []  Patient limited by other medical complications   []  Other:   OutComes Score:   QUICK DASH:84.1  Goals:   Pt will decrease pain with use to 1-2/10 to increase functional activity tolerance   Pt will increase AROM of R wrist and hand to [facility] to increase functional mobility for daily activities   Pt will increase AROM R elbow to WNL to increase functional use of R elbow   Pt will follow thru and be independent with HEP     LTGs  Pt will decrease pain with use to 0-1/10 to increase functional activity tolerance   Pt will increase AROM R wrist and hand to WNL to return to PLOF for self care   Pt will increase R  20-30# to increase functional grasp to perform daily chores of cooking, washing dishes, opening jars.   Pt will decrease quick dash below 50 for significant performance improvement      Patient Requires Follow-up:  [x]  Yes  []  No    Plan: []  Continue per plan of care []  Alter current plan (see comments)   [x]  Plan of care initiated []  Hold pending MD visit []  Discharge    Plan for Next Session:      Electronically signed by:  Susan Mancia OT,SHAMEKAR/L, 7/26/2023, 2:23 PM

## 2023-07-31 ENCOUNTER — HOSPITAL ENCOUNTER (OUTPATIENT)
Dept: OCCUPATIONAL THERAPY | Age: 78
Setting detail: THERAPIES SERIES
Discharge: HOME | End: 2023-07-31
Payer: MEDICARE

## 2023-07-31 PROCEDURE — 97530 THERAPEUTIC ACTIVITIES: CPT

## 2023-07-31 PROCEDURE — 97110 THERAPEUTIC EXERCISES: CPT

## 2023-07-31 PROCEDURE — 97022 WHIRLPOOL THERAPY: CPT

## 2023-07-31 NOTE — FLOWSHEET NOTE
Occupational Therapy Out Patient Daily Treatment Note     [x][address]     [address] [facility] [address]       640 [facility], [address], [address]       Sadia Razo, [address]     [phone]  [phone] [phone]  ______________________________________________________________________  Date:  2023  Patient Name:  Kasie Mccarthy    :  1945  Restrictions/Precautions:  General  Diagnosis:   R triquetral and radial head fractures  Treatment Diagnosis:   Hand and wrist pain and stiffness  Insurance/Certification information:  [facility]  Referring Physician:   Dr Alexandria Palacio of care signed (Y/N):    Visit# / total visits: 3/10  COVID screening questions were asked and patient attested that there had been no contact or symptoms    Pain level: 0/10 Not constant    Subjective: States . Prior Level of Function:  Full functional use before injury  Patient Goals: Return to PLOF    Treatment Flowsheet   Right Left   Fluidotherapy x    AROM PROM wrist and hand and elbow x      Digiflex 3# x    Pinch pin 2# lateral and palmar x    Wrist flex and ext 1# x    Rubber band with finger extension x    8oz hammer sup/pron x                                                                                          Interventions/Modalities used:  [x] Therapeutic Exercise   [x] Modalities:  [x] Therapeutic Activity    [] Ultrasound [] Elec Stimulation   [] Total Motion Release    [] Fluido [] Kinesiotaping  [] Neuromuscular Re-education   [] Ionto [x] Coldpack/hotpack   [x] Instruction in HEP    Other:    Objective Findings: Able to make a full fist with all but index after heat and exercise. Wrist extension 47 flexion 50.   Elbow 0/138  R 20.3#    Communication with other providers: POC to physician    Education provided to patient:    Adverse Reactions to treatment: none noted    Time in: 1345  Time out:

## 2023-08-02 ENCOUNTER — HOSPITAL ENCOUNTER (OUTPATIENT)
Dept: OCCUPATIONAL THERAPY | Age: 78
Setting detail: THERAPIES SERIES
Discharge: HOME | End: 2023-08-02
Payer: MEDICARE

## 2023-08-02 PROCEDURE — 97022 WHIRLPOOL THERAPY: CPT

## 2023-08-02 PROCEDURE — 97110 THERAPEUTIC EXERCISES: CPT

## 2023-08-02 PROCEDURE — 97530 THERAPEUTIC ACTIVITIES: CPT

## 2023-08-02 NOTE — FLOWSHEET NOTE
Occupational Therapy Out Patient Daily Treatment Note     [x][address]     [address]       640 [facility] [address], [address], [address], [address]     [phone]  [phone] [phone]  ______________________________________________________________________  Date:  2023  Patient Name:  Rachel Lesch    :  1945  Restrictions/Precautions:  General  Diagnosis:   R triquetral and radial head fractures  Treatment Diagnosis:   Hand and wrist pain and stiffness  Insurance/Certification information:  [facility]  Referring Physician:   Dr Matteo Rico of care signed (Y/N):    Visit# / total visits: 4/10  COVID screening questions were asked and patient attested that there had been no contact or symptoms    Pain level: 0/10 Not constant    Subjective: States is gradually improving. .  Prior Level of Function:  Full functional use before injury  Patient Goals: Return to PLOF    Treatment Flowsheet   Right Left   Fluidotherapy x    AROM PROM wrist and hand and elbow x      Digiflex 3# x    Pinch pin 2# lateral and palmar x    Wrist flex and ext 1# x    Rubber band with finger extension x    8oz hammer sup/pron x                                                                                          Interventions/Modalities used:  [x] Therapeutic Exercise   [x] Modalities:  [x] Therapeutic Activity    [] Ultrasound [] Elec Stimulation   [] Total Motion Release    [] Fluido [] Kinesiotaping  [] Neuromuscular Re-education   [] Ionto [x] Coldpack/hotpack   [x] Instruction in HEP    Other:    Objective Findings: Able to make a full fist with all but index after heat and exercise. Wrist extension 47 flexion 53.   Elbow 0/138  R 22.3#    Communication with other providers: POC to physician    Education provided to patient:    Adverse Reactions to treatment: none

## 2023-08-08 ENCOUNTER — OFFICE VISIT (OUTPATIENT)
Dept: ORTHOPEDIC SURGERY | Age: 78
End: 2023-08-08

## 2023-08-08 VITALS — OXYGEN SATURATION: 99 % | RESPIRATION RATE: 18 BRPM | HEART RATE: 82 BPM | TEMPERATURE: 98.9 F

## 2023-08-08 DIAGNOSIS — S52.124D CLOSED NONDISPLACED FRACTURE OF HEAD OF RIGHT RADIUS WITH ROUTINE HEALING, SUBSEQUENT ENCOUNTER: Primary | ICD-10-CM

## 2023-08-08 NOTE — PATIENT INSTRUCTIONS
Continue to weight bear as tolerated  Continue range of motion  Ice and elevate as needed  Tylenol or Motrin for pain  Follow up in 6 weeks, repeat xrays if not doing german  We are committed to providing you the best care possible. If you receive a survey after visiting one of our offices, please take time to share your experience concerning your physician office visit. These surveys are confidential and no health information about you is shared. We are eager to improve for you and we are counting on your feedback to help make that happen.

## 2023-08-08 NOTE — PROGRESS NOTES
Patient seen in office today for: Closed Fx of right elbow, Closed chip Fx of triquetrum of right wrist     DOI: 6/5/23  DOS: n/a   Date of last injection:  n/a     Patient reports 0/10 pain. RICE and medication are effective to alleviate pain and reduce swelling. Patient continues with occupational therapy for hand therapy. Xrays performed in office today.      Right handed

## 2023-09-26 ENCOUNTER — OFFICE VISIT (OUTPATIENT)
Dept: ORTHOPEDIC SURGERY | Age: 78
End: 2023-09-26
Payer: MEDICARE

## 2023-09-26 VITALS
HEART RATE: 75 BPM | WEIGHT: 182 LBS | OXYGEN SATURATION: 97 % | HEIGHT: 66 IN | RESPIRATION RATE: 15 BRPM | BODY MASS INDEX: 29.25 KG/M2

## 2023-09-26 DIAGNOSIS — S62.111A TRIQUETRAL CHIP FRACTURE, RIGHT, CLOSED, INITIAL ENCOUNTER: ICD-10-CM

## 2023-09-26 DIAGNOSIS — S52.124D CLOSED NONDISPLACED FRACTURE OF HEAD OF RIGHT RADIUS WITH ROUTINE HEALING, SUBSEQUENT ENCOUNTER: Primary | ICD-10-CM

## 2023-09-26 PROCEDURE — 1123F ACP DISCUSS/DSCN MKR DOCD: CPT | Performed by: ORTHOPAEDIC SURGERY

## 2023-09-26 PROCEDURE — 99212 OFFICE O/P EST SF 10 MIN: CPT | Performed by: ORTHOPAEDIC SURGERY

## 2023-09-26 NOTE — PROGRESS NOTES
Patient returns to the office today for FU of the right elbow and right wrist pain. Pt states she is still in the mornings but will get better as the day goes on. She does have some swelling into the index finger. Pt states overall she is doing well.

## 2023-09-29 ASSESSMENT — ENCOUNTER SYMPTOMS
CHEST TIGHTNESS: 0
SHORTNESS OF BREATH: 0
COLOR CHANGE: 0

## 2023-12-15 LAB
BASOPHILS ABSOLUTE: 0 /ΜL
BASOPHILS RELATIVE PERCENT: 1 %
BUN BLDV-MCNC: 8 MG/DL
CALCIUM SERPL-MCNC: 9.9 MG/DL
CHLORIDE BLD-SCNC: 102 MMOL/L
CHOLESTEROL, TOTAL: 183 MG/DL
CHOLESTEROL/HDL RATIO: 3.9
CO2: 26 MMOL/L
CREAT SERPL-MCNC: 0.81 MG/DL
EGFR: 74
EOSINOPHILS ABSOLUTE: 0.1 /ΜL
EOSINOPHILS RELATIVE PERCENT: 1 %
GLUCOSE BLD-MCNC: 87 MG/DL
HCT VFR BLD CALC: 40.4 % (ref 36–46)
HDLC SERPL-MCNC: 47 MG/DL (ref 35–70)
HEMOGLOBIN: 13.4 G/DL (ref 12–16)
LDL CHOLESTEROL CALCULATED: 116 MG/DL (ref 0–160)
LYMPHOCYTES ABSOLUTE: 1.9 /ΜL
LYMPHOCYTES RELATIVE PERCENT: 33 %
MCH RBC QN AUTO: 26.7 PG
MCHC RBC AUTO-ENTMCNC: 33.2 G/DL
MCV RBC AUTO: 81 FL
MONOCYTES ABSOLUTE: 0.6 /ΜL
MONOCYTES RELATIVE PERCENT: 10 %
NEUTROPHILS ABSOLUTE: 3.2 /ΜL
NEUTROPHILS RELATIVE PERCENT: 55 %
NONHDLC SERPL-MCNC: NORMAL MG/DL
PLATELET # BLD: 237 K/ΜL
PMV BLD AUTO: NORMAL FL
POTASSIUM SERPL-SCNC: 4.3 MMOL/L
RBC # BLD: 5.02 10^6/ΜL
SODIUM BLD-SCNC: 141 MMOL/L
TRIGL SERPL-MCNC: 112 MG/DL
VLDLC SERPL CALC-MCNC: 20 MG/DL
WBC # BLD: 5.8 10^3/ML

## 2024-04-18 ENCOUNTER — OFFICE VISIT (OUTPATIENT)
Dept: CARDIOLOGY CLINIC | Age: 79
End: 2024-04-18
Payer: MEDICARE

## 2024-04-18 VITALS
OXYGEN SATURATION: 97 % | HEIGHT: 66 IN | DIASTOLIC BLOOD PRESSURE: 68 MMHG | SYSTOLIC BLOOD PRESSURE: 138 MMHG | BODY MASS INDEX: 29.12 KG/M2 | WEIGHT: 181.2 LBS | HEART RATE: 61 BPM

## 2024-04-18 DIAGNOSIS — I70.90 ATHEROSCLEROSIS: ICD-10-CM

## 2024-04-18 DIAGNOSIS — E78.5 BORDERLINE HYPERLIPIDEMIA: ICD-10-CM

## 2024-04-18 DIAGNOSIS — I87.2 VENOUS INSUFFICIENCY: ICD-10-CM

## 2024-04-18 DIAGNOSIS — M79.89 LEFT LEG SWELLING: Primary | ICD-10-CM

## 2024-04-18 PROCEDURE — 1123F ACP DISCUSS/DSCN MKR DOCD: CPT | Performed by: INTERNAL MEDICINE

## 2024-04-18 PROCEDURE — 99214 OFFICE O/P EST MOD 30 MIN: CPT | Performed by: INTERNAL MEDICINE

## 2024-04-18 NOTE — ASSESSMENT & PLAN NOTE
I do not have any conclusive evidence of significant atherosclerosis on the test I have reviewed and in the media.  We talked about the plaque buildup with age and how we can slow the process down by aggressive risk factor modification and healthy lifestyles and she asked several questions in this regard and verbalized understanding.  Continue aggressive risk factor modification for primary prevention.

## 2024-04-18 NOTE — PATIENT INSTRUCTIONS
Please be informed that if you contact our office outside of normal business hours the physician on call cannot help with any scheduling or rescheduling issues, procedure instruction questions or any type of medication issue.    We advise you for any urgent/emergency that you go to the nearest emergency room!    PLEASE CALL OUR OFFICE DURING NORMAL BUSINESS HOURS    Monday - Friday   8 am to 5 pm    [place]: [phone]    [place]: [phone]    [place]:  [phone]    **It is YOUR responsibilty to bring medication bottles and/or updated medication list to EACH APPOINTMENT. This will allow us to better serve you and all your healthcare needs**    Thank you for allowing us to care for you today!   We want to ensure we can follow your treatment plan and we strive to give you the best outcomes and experience possible.   If you ever have a life threatening emergency and call 911 - for an ambulance (EMS)   Our providers can only care for you at:   [facility] or [facility].   Even if you have someone take you or you drive yourself we can only care for you in a [facility] facility. Our providers are not setup at the other healthcare locations!

## 2024-04-18 NOTE — ASSESSMENT & PLAN NOTE
Counseled to continue elevate feet when resting and when resting in bed at night with pillows underneath and use thigh high compression stockings in the morning and remove them at night.  Patient verbalizes understanding. Questions answered.

## 2024-04-18 NOTE — ASSESSMENT & PLAN NOTE
Repeat lipids in December showed improved LDL from 129 down to 216.  She has made positive dietary changes and walking more and trying to bring the numbers down.  I have encouraged her to continue the same and I do not recommend statin therapy at this point.  She had tried red yeast rice in the past and did not tolerate and not taking it.

## 2024-04-18 NOTE — PROGRESS NOTES
Renetta Bacon  1945  Hoang Decker MD      Chief Complaint   Patient presents with    Edema     Left leg  Pt denies other cardiac symptoms       Chief complaint and HPI:  Renetta Bacon  is a 78 y.o. female following up for left leg swelling.  She says it is not much worse than the last year.  She is walking regularly and using mostly below-knee compression stockings and denies any aching or pain or itching or redness or cramping.    Rest of the Cardiovascular system review is otherwise unchanged from prior encounter.  Past medical history:  has a past medical history of History of cancer of spinal meninges, History of Holter monitoring, History of stress test, Hx of cardiovascular stress test, Hx of echocardiogram, Left leg swelling, and Venous insufficiency.  Past surgical history:  has a past surgical history that includes Tonsillectomy and Colonoscopy.  Social History:   Social History     Tobacco Use    Smoking status: Never    Smokeless tobacco: Never   Substance Use Topics    Alcohol use: No     Family history: family history includes Cancer in her father and mother.  ALLERGIES:  Patient has no known allergies.  Prior to Admission medications    Medication Sig Start Date End Date Taking? Authorizing Provider   ibuprofen (ADVIL;MOTRIN) 600 MG tablet Take 1 tablet by mouth every 8 hours as needed for Pain 7/11/23  Yes Orlando Johnson MD   Lysine 1000 MG TABS Take by mouth   Yes Flavio Villalobos MD   ibuprofen (IBU) 600 MG tablet Take 1 tablet by mouth every 6 hours as needed for Pain 6/5/23 4/18/24 Yes Woodrow Sanders MD   MULTIPLE VITAMIN PO Take by mouth   Yes Flavio Villalobos MD   Coenzyme Q10 (COQ-10 PO) Take by mouth   Yes Flavio Villalobos MD   Omega-3 Fatty Acids (OMEGA 3 PO) Take by mouth   Yes Flavio Villalobos MD   Cholecalciferol (VITAMIN D3 PO) Take by mouth   Yes Flavio Villalobos MD   Compression Stockings MISC by Does not apply route 20-30 mmg thigh high stockings

## 2024-05-02 ENCOUNTER — OFFICE VISIT (OUTPATIENT)
Dept: CARDIOLOGY CLINIC | Age: 79
End: 2024-05-02
Payer: MEDICARE

## 2024-05-02 VITALS
WEIGHT: 182 LBS | HEART RATE: 66 BPM | SYSTOLIC BLOOD PRESSURE: 124 MMHG | BODY MASS INDEX: 29.25 KG/M2 | HEIGHT: 66 IN | OXYGEN SATURATION: 95 % | DIASTOLIC BLOOD PRESSURE: 66 MMHG

## 2024-05-02 DIAGNOSIS — M79.602 LEFT ARM PAIN: Primary | ICD-10-CM

## 2024-05-02 DIAGNOSIS — I70.90 ATHEROSCLEROSIS: ICD-10-CM

## 2024-05-02 DIAGNOSIS — E78.5 BORDERLINE HYPERLIPIDEMIA: ICD-10-CM

## 2024-05-02 PROCEDURE — 1123F ACP DISCUSS/DSCN MKR DOCD: CPT | Performed by: INTERNAL MEDICINE

## 2024-05-02 PROCEDURE — 99213 OFFICE O/P EST LOW 20 MIN: CPT | Performed by: INTERNAL MEDICINE

## 2024-05-02 RX ORDER — MULTIVIT WITH MINERALS/LUTEIN
1000 TABLET ORAL DAILY
COMMUNITY

## 2024-05-02 RX ORDER — VIT C/B6/B5/MAGNESIUM/HERB 173 50-5-6-5MG
1000 CAPSULE ORAL DAILY
COMMUNITY

## 2024-05-02 NOTE — ASSESSMENT & PLAN NOTE
Symptoms from today's findings are very much suggestive of musculoskeletal symptoms however she is extremely anxious and nervous.  We will do a regular stress test to further reassure her.

## 2024-05-02 NOTE — PATIENT INSTRUCTIONS
Obtain a regular stress test to evaluate her symptoms which are very much suggestive of noncardiac and musculoskeletal etiology however she is extremely anxious and nervous.  Continue aggressive risk factor modification for primary prevention. After visit summery is provided.   Questions answered and patient verbalizes understanding. Follow up in April of next year as scheduled sooner if needed.

## 2024-05-02 NOTE — PROGRESS NOTES
noted.  Neurologic:  Oriented to time, place, and person and non-anxious. No focal neurological deficit noted.  Psychiatric: She is anxious      EKG today is consistent with sinus bradycardia 59 bpm otherwise normal EKG.    LAB REVIEW:  CBC:   Lab Results   Component Value Date/Time    WBC 5.8 12/15/2023 12:00 AM    HGB 13.4 12/15/2023 12:00 AM    HCT 40.4 12/15/2023 12:00 AM     12/15/2023 12:00 AM     Lipids:   Component  Ref Range & Units 12/15/23 8/23/21 0821 5/29/15 0740   Cholesterol, Total  mg/dL 183 191 R 177 R   HDL  35 - 70 mg/dL 47 41 R 35 Low  R   LDL Calculated  0 - 160 mg/dL 116 129 High  R    Triglycerides  mg/dL 112 107 R 135 R     Renal:   Lab Results   Component Value Date/Time    BUN 8 12/15/2023 12:00 AM    CREATININE 0.81 12/15/2023 12:00 AM     12/15/2023 12:00 AM    K 4.3 12/15/2023 12:00 AM     PT/INR:   Lab Results   Component Value Date/Time    INR 1.10 07/23/2015 11:10 PM     No results found for: \"LABA1C\"    IMPRESSION and RECOMMENDATIONS:      1. Left arm pain  Assessment & Plan:  Symptoms from today's findings are very much suggestive of musculoskeletal symptoms however she is extremely anxious and nervous.  We will do a regular stress test to further reassure her.    Orders:  -     Exercise stress test; Future  2. Borderline hyperlipidemia  Assessment & Plan:  Recent LDL was 112 and no lipid-lowering therapy.  Continue diet and exercise.    Orders:  -     Exercise stress test; Future  3. Atherosclerosis  Assessment & Plan:  I do not have any conclusive evidence of significant atherosclerosis on the test I have reviewed and in the media.  We talked about the plaque buildup with age and how we can slow the process down by aggressive risk factor modification and healthy lifestyles and she asked several questions in this regard and verbalized understanding.  Continue aggressive risk factor modification for primary prevention.    Orders:  -     Exercise stress test;

## 2024-05-15 DIAGNOSIS — R94.39 ABNORMAL STRESS ECG: Primary | ICD-10-CM

## 2024-05-16 DIAGNOSIS — R94.39 ABNORMAL STRESS ELECTROCARDIOGRAM TEST USING TREADMILL: Primary | ICD-10-CM

## 2024-05-16 DIAGNOSIS — R94.31 ABNORMAL ECG DURING EXERCISE STRESS TEST: ICD-10-CM

## 2024-05-20 ENCOUNTER — TELEPHONE (OUTPATIENT)
Dept: CARDIOLOGY CLINIC | Age: 79
End: 2024-05-20

## 2024-05-20 NOTE — TELEPHONE ENCOUNTER
Called pt in regards to lexiscan results. Informed pt with those results, continue current plan and follow up at next appointment. Pt voiced understanding                Image quality is good.    Stress Test: A pharmacological stress test was performed using regadenoson (Lexiscan). Blood pressure demonstrated a normal response and heart rate demonstrated a normal response to stress. The patient's heart rate recovery was normal.    Rest Function: Resting ejection fraction was 77%. EDV was 50 ml ESV was 11 ml

## 2024-06-13 ENCOUNTER — OFFICE VISIT (OUTPATIENT)
Dept: CARDIOLOGY CLINIC | Age: 79
End: 2024-06-13
Payer: MEDICARE

## 2024-06-13 VITALS
DIASTOLIC BLOOD PRESSURE: 64 MMHG | BODY MASS INDEX: 28.93 KG/M2 | SYSTOLIC BLOOD PRESSURE: 136 MMHG | HEIGHT: 66 IN | HEART RATE: 70 BPM | WEIGHT: 180 LBS

## 2024-06-13 DIAGNOSIS — M79.89 LEFT LEG SWELLING: Primary | ICD-10-CM

## 2024-06-13 DIAGNOSIS — I87.2 VENOUS INSUFFICIENCY: ICD-10-CM

## 2024-06-13 DIAGNOSIS — R94.39 ABNORMAL STRESS ECG: ICD-10-CM

## 2024-06-13 PROCEDURE — 1123F ACP DISCUSS/DSCN MKR DOCD: CPT | Performed by: INTERNAL MEDICINE

## 2024-06-13 PROCEDURE — 99213 OFFICE O/P EST LOW 20 MIN: CPT | Performed by: INTERNAL MEDICINE

## 2024-06-13 NOTE — ASSESSMENT & PLAN NOTE
We reviewed her venous study from 2022 in detail and multiple questions were answered. Counseled to elevate feet when resting and when resting in bed at night with pillows underneath and use thigh high compression stockings in the morning and remove them at night.  Patient verbalizes understanding. Questions answered.

## 2024-06-13 NOTE — PATIENT INSTRUCTIONS
Continue to exercise regularly. Counseled for calorie counting, reduction in serving size and exercise and lifestyle modification for weight loss.  Appropriate prescriptions if needed on this visit are addressed. After visit summery is provided.   Questions answered and patient verbalizes understanding. Follow up in 10 months,  sooner if needed.

## 2024-06-13 NOTE — PROGRESS NOTES
Renetta Bacon  1945  Hoang Decker MD      Chief Complaint   Patient presents with    Results     Patient here for testing results    Edema     Patient has some leg edema, more on the left leg.     Chief complaint and HPI:  Renetta Bacon  is a 78 y.o. female following up for results of stress test she had recently.  She brings up a lot of questions about homocystine level and CRP and leg swelling.  She had venous Doppler done and has common femoral artery valve incompetence and she has been advised accordingly for conservative therapy.  Medications reviewed and reconciled.  Rest of the Cardiovascular system review is otherwise unchanged from prior encounter.  Past medical history:  has a past medical history of History of cancer of spinal meninges, History of Holter monitoring, History of stress test, Hx of cardiovascular stress test, Hx of echocardiogram, Left leg swelling, and Venous insufficiency.  Past surgical history:  has a past surgical history that includes Tonsillectomy and Colonoscopy.  Social History:   Social History     Tobacco Use    Smoking status: Never    Smokeless tobacco: Never   Substance Use Topics    Alcohol use: No     Comment: Caffiene - None     Family history: family history includes Cancer in her father and mother.  ALLERGIES:  Patient has no known allergies.  Prior to Admission medications    Medication Sig Start Date End Date Taking? Authorizing Provider   turmeric (QC TUMERIC COMPLEX) 500 MG CAPS Take 2 capsules by mouth daily   Yes Flaivo Villalobos MD   vitamin E 1000 units capsule Take 1 capsule by mouth daily   Yes Flavio Villalobos MD   Bioflavonoid Products (TALISHA VITAMIN C-FLAVONOIDS PO) Take 550 mg by mouth Daily   Yes Flavio Villalobos MD   Lysine 1000 MG TABS Take by mouth   Yes Flavio Villalobos MD   MULTIPLE VITAMIN PO Take by mouth   Yes Flavio Villalobos MD   Omega-3 Fatty Acids (OMEGA 3 PO) Take by mouth   Yes Flavio Villalobos MD

## 2024-06-13 NOTE — ASSESSMENT & PLAN NOTE
Pharmacological nuclear stress test is negative for any ischemia.  Left ventricle size and function is normal.  Continue primary prevention.

## 2025-04-10 NOTE — PROGRESS NOTES
MRN: [id number]  Name: Renetta Bacon  : 1945    Insurance: Payor: [facility] MEDICARE /  /  /      Phone #: [phone]  Provider: ADILENE Saldivar CNP     Date of Visit: Visit date not found    Reason for visit:  Recent Hospitalization Date:    Reason for Hospitalization:    Last EK2024  Type of Device:       Vitals BP HR O2% WT HT ORTHO BP LYING ORTHO BP SITTING ORTHO BP SITTING   Today's Findings           Patients work up- Check List     Testing Last Date Completed Date Expected  ("[employer] One) Additional Notes    MA to document For provider to complete Either MA or Provider    Carotid Duplex  STAT 1 WK 6 MTH       THIS WK 2 WK 1 YEAR     Cardiac CTA  STAT 1 WK 6 MTH       THIS WK 2 WK 1 YEAR     Cardiac CT Calcium scoring  STAT 1 WK 6 MTH       THIS WK 2 WK 1 YEAR     CTA Chest, Abdomen & Pelvis  STAT 1 WK 6 MTH       THIS WK 2 WK 1 YEAR     CT Chest IV w/ Contrast  STAT 1 WK 6 MTH       THIS WK 2 WK 1 YEAR     CT Chest w/o Contrast  STAT 1 WK 6 MTH       THIS WK 2 WK 1 YEAR     CXR  STAT 1 WK 6 MTH       THIS WK 2 WK 1 YEAR     ECHO  Stress Complete Limited     MRI- Cardiac  STAT 1 WK 6 MTH       THIS WK 2 WK 1 YEAR     MUGA Scan  STAT 1 WK 6 MTH       THIS WK 2 WK 1 YEAR     Nuclear Stress 2024 Lexiscan Cardiolite     PFT  STAT 1 WK 6 MTH       THIS WK 2 WK 1 YEAR     Treadmill Stress Test 2024 STAT 1 WK 6 MTH       THIS WK 2 WK 1 YEAR     Vascular Duplex  Lower: Right Left Bilat       Upper: Right Left Bilat     Other Test Not Listed:    Monitors Last Date Completed Day's Request/Ordered     Holter  Short term 24 hours 48 hours      Long term 3 days 7 days 14 days   Event   (1-30 days)      Procedures Last Date Performed Procedure Details Date Expected   Additional Notes    ASD Closure        Carotid Angio        Cardioversion        Heart Cath  R L R&L      Peripheral Angio  R L      PFO Closure        PTCA/PCI        BISMARK        BISMARK/Cardioversion        Venogram        Tilt Table

## 2025-04-22 ENCOUNTER — TELEPHONE (OUTPATIENT)
Dept: CARDIOLOGY CLINIC | Age: 80
End: 2025-04-22

## 2025-05-06 NOTE — PROGRESS NOTES
MRN: [id number]  Name: Renetta Bacon  : 1945    Insurance: Payor: [facility] MEDICARE /  /  /      Phone #: [phone]  Provider: Nikki Colin MD     Date of Visit: 2025    Reason for visit:  Recent Hospitalization Date:    Reason for Hospitalization:    Last EKG:w ekg  Type of Device:       Vitals BP HR O2% WT HT ORTHO BP LYING ORTHO BP SITTING ORTHO BP SITTING   Today's Findings           Patients work up- Check List     Testing Last Date Completed Date Expected  ([place] One) Additional Notes    MA to document For provider to complete Either MA or Provider    Carotid Duplex  STAT 1 WK 6 MTH       THIS WK 2 WK 1 YEAR     Cardiac CTA  STAT 1 WK 6 MTH       THIS WK 2 WK 1 YEAR     Cardiac CT Calcium scoring  STAT 1 WK 6 MTH       THIS WK 2 WK 1 YEAR     CTA Chest, Abdomen & Pelvis  STAT 1 WK 6 MTH       THIS WK 2 WK 1 YEAR     CT Chest IV w/ Contrast  STAT 1 WK 6 MTH       THIS WK 2 WK 1 YEAR     CT Chest w/o Contrast  STAT 1 WK 6 MTH       THIS WK 2 WK 1 YEAR     CXR  STAT 1 WK 6 MTH       THIS WK 2 WK 1 YEAR     ECHO  Stress Complete Limited     MRI- Cardiac  STAT 1 WK 6 MTH       THIS WK 2 WK 1 YEAR     MUGA Scan  STAT 1 WK 6 MTH       THIS WK 2 WK 1 YEAR     Nuclear Stress  Lexiscan Cardiolite     PFT  STAT 1 WK 6 MTH       THIS WK 2 WK 1 YEAR     Treadmill Stress Test  STAT 1 WK 6 MTH       THIS WK 2 WK 1 YEAR     Vascular Duplex  Lower: Right Left Bilat       Upper: Right Left Bilat     Other Test Not Listed:    Monitors Last Date Completed Day's Request/Ordered     Holter  Short term 24 hours 48 hours      Long term 3 days 7 days 14 days   Event   (1-30 days)      Procedures Last Date Performed Procedure Details Date Expected   Additional Notes    ASD Closure        Carotid Angio        Cardioversion        Heart Cath  R L R&L      Peripheral Angio  R L      PFO Closure        PTCA/PCI        BISMARK        BISMARK/Cardioversion        Venogram        Tilt Table        Other Type of Procedure:   Cardiac

## 2025-05-14 ENCOUNTER — OFFICE VISIT (OUTPATIENT)
Dept: CARDIOLOGY CLINIC | Age: 80
End: 2025-05-14
Payer: MEDICARE

## 2025-05-14 VITALS
BODY MASS INDEX: 29.41 KG/M2 | DIASTOLIC BLOOD PRESSURE: 60 MMHG | HEIGHT: 66 IN | SYSTOLIC BLOOD PRESSURE: 128 MMHG | OXYGEN SATURATION: 97 % | WEIGHT: 183 LBS | HEART RATE: 63 BPM

## 2025-05-14 DIAGNOSIS — I87.2 VENOUS INSUFFICIENCY: ICD-10-CM

## 2025-05-14 DIAGNOSIS — I70.90 ATHEROSCLEROSIS: ICD-10-CM

## 2025-05-14 DIAGNOSIS — M79.89 LEFT LEG SWELLING: Primary | ICD-10-CM

## 2025-05-14 PROCEDURE — 1159F MED LIST DOCD IN RCRD: CPT | Performed by: INTERNAL MEDICINE

## 2025-05-14 PROCEDURE — 99213 OFFICE O/P EST LOW 20 MIN: CPT | Performed by: INTERNAL MEDICINE

## 2025-05-14 PROCEDURE — 1123F ACP DISCUSS/DSCN MKR DOCD: CPT | Performed by: INTERNAL MEDICINE

## 2025-05-14 PROCEDURE — 93000 ELECTROCARDIOGRAM COMPLETE: CPT | Performed by: INTERNAL MEDICINE

## 2025-05-14 NOTE — PATIENT INSTRUCTIONS
Walk regularly. Elevate feet when resting.  Appropriate prescriptions if needed on this visit are addressed. After visit summery is provided. Primary prevention is the goal by aggressive risk modification, healthy and therapeutic life style changes for cardiovascular risk reduction. Various goals are discussed and questions answered.    Questions answered and patient verbalizes understanding. Follow up in 12 months,  sooner if needed.  Please bring all medication bottles and most recent labs to each office visit

## 2025-05-14 NOTE — ASSESSMENT & PLAN NOTE
No evidence of clinically significant vascular disease vascular disease.  Counseled for primary prevention by aggressive risk factor modification.

## 2025-05-14 NOTE — PROGRESS NOTES
Renetta Bacon  1945  Hoang Decker MD      Chief Complaint   Patient presents with    1 Year Follow Up     Pt states discomfort in legs does wear compression stockings , pt states no other cardiac sx at this time      Chief complaint and HPI:  Renetta Bacon  is a 79 y.o. female following up for chronic venous insufficiency and leg swelling knowledge of atherosclerosis she has an anxiety.  She has been coughing and has upper respiratory infection and has not done any exercises for the last 3 weeks and self isolating herself at her home from other family members had not had any test done had not seen her PCP and she says her symptoms are a lot better now.  She is wearing a facemask.  Medications reviewed she is not on much prescription medications from cardiovascular standpoint.  She reports aching of muscles in legs at rest not necessarily during walking.  She has questions about homocystine level and C-reactive protein levels.  She says she read somewhere they are helpful to make diagnosis.  Rest of the Cardiovascular system review is otherwise unchanged from prior encounter.  Past medical history:  has a past medical history of History of cancer of spinal meninges, History of Holter monitoring, History of stress test, Hx of cardiovascular stress test, Hx of echocardiogram, Left leg swelling, and Venous insufficiency.  Past surgical history:  has a past surgical history that includes Tonsillectomy and Colonoscopy.  Social History:   Social History     Tobacco Use    Smoking status: Never    Smokeless tobacco: Never   Substance Use Topics    Alcohol use: No     Comment: Caffiene - None     Family history: family history includes Cancer in her father and mother.  ALLERGIES:  Patient has no known allergies.  Current Outpatient Medications   Medication Sig Dispense Refill    turmeric (QC TUMERIC COMPLEX) 500 MG CAPS Take 2 capsules by mouth daily      Bioflavonoid Products (TALISHA VITAMIN C-FLAVONOIDS PO)